# Patient Record
Sex: FEMALE | Race: WHITE | NOT HISPANIC OR LATINO | URBAN - METROPOLITAN AREA
[De-identification: names, ages, dates, MRNs, and addresses within clinical notes are randomized per-mention and may not be internally consistent; named-entity substitution may affect disease eponyms.]

---

## 2023-03-24 ENCOUNTER — OFFICE VISIT (OUTPATIENT)
Dept: URGENT CARE | Facility: CLINIC | Age: 18
End: 2023-03-24
Payer: COMMERCIAL

## 2023-03-24 VITALS
HEART RATE: 76 BPM | DIASTOLIC BLOOD PRESSURE: 68 MMHG | WEIGHT: 130 LBS | TEMPERATURE: 98 F | OXYGEN SATURATION: 99 % | RESPIRATION RATE: 16 BRPM | SYSTOLIC BLOOD PRESSURE: 105 MMHG

## 2023-03-24 DIAGNOSIS — R52 BODY ACHES: ICD-10-CM

## 2023-03-24 DIAGNOSIS — R09.81 NASAL CONGESTION: ICD-10-CM

## 2023-03-24 DIAGNOSIS — J06.9 UPPER RESPIRATORY TRACT INFECTION, UNSPECIFIED TYPE: ICD-10-CM

## 2023-03-24 DIAGNOSIS — R50.9 FEVER, UNSPECIFIED FEVER CAUSE: Primary | ICD-10-CM

## 2023-03-24 LAB
CTP QC/QA: YES
FLUAV AG NPH QL: NEGATIVE
FLUBV AG NPH QL: NEGATIVE

## 2023-03-24 PROCEDURE — 87804 POCT INFLUENZA A/B: ICD-10-PCS | Mod: 59,QW,, | Performed by: NURSE PRACTITIONER

## 2023-03-24 PROCEDURE — 99204 OFFICE O/P NEW MOD 45 MIN: CPT | Mod: S$GLB,,, | Performed by: NURSE PRACTITIONER

## 2023-03-24 PROCEDURE — 99204 PR OFFICE/OUTPT VISIT, NEW, LEVL IV, 45-59 MIN: ICD-10-PCS | Mod: S$GLB,,, | Performed by: NURSE PRACTITIONER

## 2023-03-24 PROCEDURE — 87804 INFLUENZA ASSAY W/OPTIC: CPT | Mod: 59,QW,, | Performed by: NURSE PRACTITIONER

## 2023-03-24 RX ORDER — AZITHROMYCIN 250 MG/1
TABLET, FILM COATED ORAL
Qty: 6 TABLET | Refills: 0 | Status: SHIPPED | OUTPATIENT
Start: 2023-03-24 | End: 2023-08-04

## 2023-03-24 NOTE — LETTER
March 24, 2023      [facility] [place]  [address]  [place] [address]  Phone: [phone]  Fax: [phone]       Patient: Sage Garcia   YOB: 2005  Date of Visit: 03/24/2023    To Whom It May Concern:    Eliezer Garcia  was at [facility] on 03/24/2023. The patient may return to work/school on 03/27/2023 with no restrictions. If you have any questions or concerns, or if I can be of further assistance, please do not hesitate to contact me.    Sincerely,    Celina Kaur NP

## 2023-03-24 NOTE — PROGRESS NOTES
CHIEF COMPLAINT  Chief Complaint   Patient presents with    Fever     Body aches, headaches, fever of 100 x 5 days 2 negative covid @ home tests since Monday        HPI  Sage Collado a 17 y.o. female who presents with complaints of fever and nasal congestion headaches and body aches for 2 days patient reports diarrhea started yesterday.  Home COVID test was negative.  Patient denies abdominal pain, vomiting, nausea, sore throat or cough.  Patient has not taken any over-the-counter medications for symptoms.  Patient denies any exposure to COVID or flu.      CURRENT MEDICATIONS  No current outpatient medications on file prior to visit.     No current facility-administered medications on file prior to visit.       ALLERGIES  Review of patient's allergies indicates:  No Known Allergies      There is no immunization history on file for this patient.    PAST MEDICAL HISTORY  History reviewed. No pertinent past medical history.    SURGICAL HISTORY  History reviewed. No pertinent surgical history.    SOCIAL HISTORY  Social History     Socioeconomic History    Marital status: Single       FAMILY HISTORY  History reviewed. No pertinent family history.    REVIEW OF SYSTEMS  Constitutional: + fever,+ body aches, no chills, or weakness.  Eyes: No redness, pain, or discharge  HENT: No ear pain, + headache, no rhinorrhea, no throat pain + nasal congestion   Respiratory: No cough, wheezing or shortness of breath  Cardiovascular: No chest pain, palpitations or edema  GI: No abdominal pain, nausea, vomiting or diarrhea  Gu: No dysuria, no hematuria, or discharge  Skin: No rash or abrasion  Neurologic: No focal weakness or sensory changes.  All systems otherwise negative except as noted in the Review of Systems and History of Present Illness      PHYSICAL EXAM  Reviewed Triage Note  VITAL SIGNS: 105/68  Constitutional: Well developed, well nourished, Alert and oriented x3, No acute distress, non-toxic appearance.  HENT:  Normocephalic, Atraumatic, Bilateral external ears normal, bilateral ear canals clear, external nose negative, bilateral turbinates with erythema & edema, oropharynx moist, No oral exudates, edema or erythema, postnasal drip noted  Eyes: PERRL, EOMI, Conjunctiva normal, No discharge.  Neck: Normal range of motion, no tenderness, supple, no carotid bruits  Respiratory: Normal breath sounds, no respiratory distress, no wheezing, no rhonchi, no rales  Cardiovascular: HR 76, normal rhythm, no murmurs, no rubs, no gallops.  Gi: Bowel sounds normal, soft, no tenderness, non-distended, no masses, no pulsatile masses.  Integument: Warm, Dry, No erythema, no rash  Neurologic: Normal motor function, normal sensory function. No focal deficits noted. Intact distal pulses  Psychiatric: Affect normal, judgment normal, mood normal      LABS  Pertinent labs reviewed. (see chart for details)  Flu negative    RADIOLOGY  No orders to display         PROCEDURE  Procedures      ED COURSE & MEDICAL DECISION MAKING    Physical exam findings and lab results discussed with patient. No acute emergent medical condition identified at this time to warrant further testing. Will dispo home with instructions to follow up with PCP tomorrow. Pt agrees with plan of care.     DISPOSITION  Patient discharged in stable condition       CLINICAL IMPRESSION:  The primary encounter diagnosis was Fever, unspecified fever cause. Diagnoses of Body aches and Nasal congestion were also pertinent to this visit.    Patient advised to follow-up with your PCP within 3 days for BP re-check if Blood Pressure was >120/80 without history of hypertension.

## 2023-03-24 NOTE — PROGRESS NOTES
Subjective:       Patient ID: Sage Garcia is a 17 y.o. female.    Vitals:  weight is 59 kg (130 lb). Her oral temperature is 98.4 °F (36.9 °C). Her blood pressure is 105/68 and her pulse is 76. Her respiration is 16 and oxygen saturation is 99%.     Chief Complaint: Fever (Body aches, headaches, fever of 100 x 5 days 2 negative covid @ home tests since Monday )    Fever   This is a new problem. The current episode started in the past 7 days. The problem occurs constantly. The problem has been unchanged. The maximum temperature noted was 100 to 100.9 F. The temperature was taken using an oral thermometer. Associated symptoms include headaches and muscle aches.     Constitution: Positive for fever.   Neurological:  Positive for headaches.     Objective:      Physical Exam      Assessment:       1. Fever, unspecified fever cause    2. Body aches          Plan:         Fever, unspecified fever cause  -     POCT Influenza A/B Rapid Antigen    Body aches  -     POCT Influenza A/B Rapid Antigen

## 2023-08-04 ENCOUNTER — OFFICE VISIT (OUTPATIENT)
Dept: URGENT CARE | Facility: CLINIC | Age: 18
End: 2023-08-04
Payer: COMMERCIAL

## 2023-08-04 VITALS
DIASTOLIC BLOOD PRESSURE: 69 MMHG | RESPIRATION RATE: 20 BRPM | WEIGHT: 130 LBS | SYSTOLIC BLOOD PRESSURE: 95 MMHG | HEART RATE: 111 BPM | HEIGHT: 62 IN | BODY MASS INDEX: 23.92 KG/M2 | TEMPERATURE: 99 F | OXYGEN SATURATION: 95 %

## 2023-08-04 DIAGNOSIS — J02.9 SORE THROAT: Primary | ICD-10-CM

## 2023-08-04 DIAGNOSIS — U07.1 POSITIVE SELF-ADMINISTERED ANTIGEN TEST FOR COVID-19: ICD-10-CM

## 2023-08-04 DIAGNOSIS — U07.1 COVID: ICD-10-CM

## 2023-08-04 LAB
CTP QC/QA: YES
SARS-COV-2 AG RESP QL IA.RAPID: POSITIVE

## 2023-08-04 PROCEDURE — 87811 SARS-COV-2 COVID19 W/OPTIC: CPT | Mod: QW,S$GLB,, | Performed by: NURSE PRACTITIONER

## 2023-08-04 PROCEDURE — 99214 OFFICE O/P EST MOD 30 MIN: CPT | Mod: S$GLB,,, | Performed by: NURSE PRACTITIONER

## 2023-08-04 PROCEDURE — 99214 PR OFFICE/OUTPT VISIT, EST, LEVL IV, 30-39 MIN: ICD-10-PCS | Mod: S$GLB,,, | Performed by: NURSE PRACTITIONER

## 2023-08-04 PROCEDURE — 87811 SARS CORONAVIRUS 2 ANTIGEN POCT, MANUAL READ: ICD-10-PCS | Mod: QW,S$GLB,, | Performed by: NURSE PRACTITIONER

## 2023-08-04 NOTE — LETTER
August 4, 2023      [facility] [place]  [address]  [place] [address]  Phone: [phone]  Fax: [phone]       Patient: Sage Garcia   YOB: 2005  Date of Visit: 08/04/2023    To Whom It May Concern:    Eliezer Garcia  was at [facility] on 08/04/2023. The patient may return to work/school on 08/09/2023 with no restrictions. If you have any questions or concerns, or if I can be of further assistance, please do not hesitate to contact me.    Sincerely,    Celina KaurNP

## 2023-08-04 NOTE — PROGRESS NOTES
Subjective:      Patient ID: Sage Garcia is a 18 y.o. female.    Vitals:  vitals were not taken for this visit.     Chief Complaint: Sore Throat    Sore Throat   This is a new problem. The current episode started today. The problem has been gradually worsening. Neither side of throat is experiencing more pain than the other. The maximum temperature recorded prior to her arrival was 100.4 - 100.9 F. The pain is mild. Associated symptoms include congestion, coughing and headaches.     HENT:  Positive for congestion and sore throat.    Respiratory:  Positive for cough.    Neurological:  Positive for headaches.    Objective:     Physical Exam    Assessment:     No diagnosis found.    Plan:       There are no diagnoses linked to this encounter.

## 2023-08-04 NOTE — PROGRESS NOTES
CHIEF COMPLAINT  Chief Complaint   Patient presents with    Sore Throat       HPI  Sage Collado a 18 y.o. female who presents with c/o sore throat, cough, headache and congestion since this morning. Pt reports she had a temp of 99 at home.  Pt reports she took tylenol prior to arrival. Pt denies abdominal pain, n/v/d, chest pain or SOB. Pt reports she had a positive covid test at home but needs a note for work.       CURRENT MEDICATIONS  Current Outpatient Medications on File Prior to Visit   Medication Sig Dispense Refill    [DISCONTINUED] azithromycin (Z-MELO) 250 MG tablet Take 2 tablets by mouth on day 1; Take 1 tablet by mouth on days 2-5 (Patient not taking: Reported on 8/4/2023) 6 tablet 0     No current facility-administered medications on file prior to visit.       ALLERGIES  Review of patient's allergies indicates:  No Known Allergies      There is no immunization history on file for this patient.    PAST MEDICAL HISTORY  History reviewed. No pertinent past medical history.    SURGICAL HISTORY  History reviewed. No pertinent surgical history.    SOCIAL HISTORY  Social History     Socioeconomic History    Marital status: Single       FAMILY HISTORY  History reviewed. No pertinent family history.    REVIEW OF SYSTEMS  Constitutional: No fever, chills, or weakness.  Eyes: No redness, pain, or discharge  HENT: No ear pain, + headache, + rhinorrhea, + throat pain  Respiratory: + cough, no wheezing or shortness of breath  Cardiovascular: No chest pain, palpitations or edema    All systems otherwise negative except as noted in the Review of Systems and History of Present Illness      PHYSICAL EXAM  Reviewed Triage Note  VITAL SIGNS: 95/69  Constitutional: Well developed, well nourished, Alert and oriented x3, No acute distress, non-toxic appearance.  HENT: Normocephalic, Atraumatic, Bilateral external ears normal, bilateral ear canals clear, external nose negative, oropharynx moist, No oral exudates, edema or  erythema  Eyes: PERRL, EOMI, Conjunctiva normal, No discharge.  Neck: Normal range of motion, no tenderness, supple, no carotid bruits  Respiratory: Normal breath sounds, no respiratory distress, no wheezing, no rhonchi, no rales  Cardiovascular: , normal rhythm, no murmurs, no rubs, no gallops.        LABS  Pertinent labs reviewed. (see chart for details)  Covid positive      ED COURSE & MEDICAL DECISION MAKING    Physical exam findings and lab results discussed with patient. No acute emergent medical condition identified at this time to warrant further testing. Will dispo home with instructions to follow up with PCP tomorrow. Instructions given for the use of OTC meds for symptom control.  Pt agrees with plan of care.     DISPOSITION  Patient discharged in stable condition       CLINICAL IMPRESSION:  The primary encounter diagnosis was Sore throat. Diagnoses of Positive self-administered antigen test for COVID-19 and COVID were also pertinent to this visit.    Patient advised to follow-up with your PCP within 3 days for BP re-check if Blood Pressure was >120/80 without history of hypertension.

## 2025-03-03 ENCOUNTER — OFFICE VISIT (OUTPATIENT)
Dept: URGENT CARE | Facility: CLINIC | Age: 20
End: 2025-03-03
Payer: COMMERCIAL

## 2025-03-03 VITALS
WEIGHT: 133 LBS | HEIGHT: 63 IN | RESPIRATION RATE: 16 BRPM | OXYGEN SATURATION: 98 % | HEART RATE: 132 BPM | SYSTOLIC BLOOD PRESSURE: 134 MMHG | DIASTOLIC BLOOD PRESSURE: 81 MMHG | BODY MASS INDEX: 23.57 KG/M2 | TEMPERATURE: 98 F

## 2025-03-03 DIAGNOSIS — R11.2 NAUSEA AND VOMITING, UNSPECIFIED VOMITING TYPE: ICD-10-CM

## 2025-03-03 DIAGNOSIS — A08.4 VIRAL GASTROENTERITIS: Primary | ICD-10-CM

## 2025-03-03 RX ORDER — ONDANSETRON 4 MG/1
4 TABLET, ORALLY DISINTEGRATING ORAL EVERY 6 HOURS PRN
Qty: 21 TABLET | Refills: 0 | Status: SHIPPED | OUTPATIENT
Start: 2025-03-03

## 2025-03-03 NOTE — PROGRESS NOTES
"Subjective:      Patient ID: Sage Garcia is a 19 y.o. female.    Vitals:  height is 5' 3" (1.6 m) and weight is 60.3 kg (133 lb). Her temperature is 98.2 °F (36.8 °C). Her blood pressure is 134/81 and her pulse is 132 (abnormal). Her respiration is 16 and oxygen saturation is 98%.     Chief Complaint: Emesis    Pt states that she woke up this morning around 0400 with nausea, vomiting and diarrhea. Pt denies any abdominal pain or other symptoms. Pt states that she has vomited about 4 times and had about 4 diarrhea episodes. Pt states that she is drinking fluids.     Emesis   This is a new problem. The current episode started today. The problem has been waxing and waning. There has been no fever. Associated symptoms include diarrhea. Pertinent negatives include no abdominal pain. Treatments tried: zofran.       Constitution: Negative.   HENT: Negative.     Neck: neck negative.   Cardiovascular: Negative.    Eyes: Negative.    Respiratory: Negative.     Gastrointestinal:  Positive for nausea, vomiting and diarrhea. Negative for abdominal pain.   Endocrine: negative.   Genitourinary: Negative.    Musculoskeletal: Negative.    Skin: Negative.    Allergic/Immunologic: Negative.    Neurological: Negative.    Hematologic/Lymphatic: Negative.    Psychiatric/Behavioral: Negative.        Objective:     Physical Exam   Constitutional: She is oriented to person, place, and time.   HENT:   Head: Normocephalic and atraumatic.   Ears:   Right Ear: Tympanic membrane, external ear and ear canal normal.   Left Ear: Tympanic membrane, external ear and ear canal normal.   Nose: Nose normal.   Mouth/Throat: Mucous membranes are moist. Oropharynx is clear.   Eyes: Conjunctivae are normal. Pupils are equal, round, and reactive to light. Extraocular movement intact   Neck: Neck supple.   Cardiovascular: Normal rate, regular rhythm, normal heart sounds and normal pulses.   Pulmonary/Chest: Effort normal and breath sounds normal. "   Abdominal: Normal appearance and bowel sounds are normal. She exhibits no distension. Soft. There is no abdominal tenderness. There is no guarding.   Musculoskeletal: Normal range of motion.         General: Normal range of motion.   Neurological: no focal deficit. She is alert, oriented to person, place, and time and at baseline.   Skin: Skin is warm.   Psychiatric: Her behavior is normal. Mood, judgment and thought content normal.   Nursing note and vitals reviewed.      Assessment:     1. Viral gastroenteritis    2. Nausea and vomiting, unspecified vomiting type        Plan:       Viral gastroenteritis  -     ondansetron (ZOFRAN-ODT) 4 MG TbDL; Take 1 tablet (4 mg total) by mouth every 6 (six) hours as needed (nausea).  Dispense: 21 tablet; Refill: 0    Nausea and vomiting, unspecified vomiting type  -     ondansetron (ZOFRAN-ODT) 4 MG TbDL; Take 1 tablet (4 mg total) by mouth every 6 (six) hours as needed (nausea).  Dispense: 21 tablet; Refill: 0

## 2025-03-03 NOTE — LETTER
March 3, 2025      [facility] [place]  [address]  [address]  [place] [address]  Phone: [phone]  Fax: [phone]       Patient: Sage Garcia   YOB: 2005  Date of Visit: 03/03/2025    To Whom It May Concern:    Eliezer Garcia  was at [facility] on 03/03/2025. The patient may return to work/school on 03/05/2025 with no restrictions. If you have any questions or concerns, or if I can be of further assistance, please do not hesitate to contact me.    Sincerely,    Viri Jacobson, NP

## 2025-03-03 NOTE — LETTER
March 3, 2025      [facility] [place]  [address]  [address]  [place] [address]  Phone: [phone]  Fax: [phone]       Patient: Sage Garcia   YOB: 2005  Date of Visit: 03/03/2025    To Whom It May Concern:    Eliezer Garcia  was at [facility] on 03/03/2025. The patient may return to work/school on 03/04/2025 with no restrictions. If you have any questions or concerns, or if I can be of further assistance, please do not hesitate to contact me.    Sincerely,    Viri Jacobson, NP